# Patient Record
Sex: FEMALE | Employment: UNEMPLOYED | URBAN - METROPOLITAN AREA
[De-identification: names, ages, dates, MRNs, and addresses within clinical notes are randomized per-mention and may not be internally consistent; named-entity substitution may affect disease eponyms.]

---

## 2024-05-06 ENCOUNTER — HOSPITAL ENCOUNTER (EMERGENCY)
Age: 29
Discharge: HOME | End: 2024-05-06
Attending: EMERGENCY MEDICINE
Payer: MEDICAID

## 2024-05-06 VITALS
SYSTOLIC BLOOD PRESSURE: 111 MMHG | WEIGHT: 92 LBS | TEMPERATURE: 98.8 F | DIASTOLIC BLOOD PRESSURE: 82 MMHG | HEART RATE: 98 BPM | OXYGEN SATURATION: 100 % | RESPIRATION RATE: 16 BRPM

## 2024-05-06 DIAGNOSIS — R11.2 CANNABINOID HYPEREMESIS SYNDROME: ICD-10-CM

## 2024-05-06 DIAGNOSIS — R19.7 NAUSEA VOMITING AND DIARRHEA: ICD-10-CM

## 2024-05-06 DIAGNOSIS — F12.90 CANNABINOID HYPEREMESIS SYNDROME: ICD-10-CM

## 2024-05-06 DIAGNOSIS — Z32.02 URINE PREGNANCY TEST NEGATIVE: ICD-10-CM

## 2024-05-06 DIAGNOSIS — E11.65 HYPERGLYCEMIA DUE TO DIABETES MELLITUS (HCC): ICD-10-CM

## 2024-05-06 DIAGNOSIS — R11.2 NAUSEA VOMITING AND DIARRHEA: ICD-10-CM

## 2024-05-06 DIAGNOSIS — R82.71 ASB (ASYMPTOMATIC BACTERIURIA): ICD-10-CM

## 2024-05-06 DIAGNOSIS — E86.0 DEHYDRATION: Primary | ICD-10-CM

## 2024-05-06 LAB
ALBUMIN SERPL-MCNC: 4 G/DL (ref 3.4–5)
ALP SERPL-CCNC: 117 U/L (ref 40–129)
ALT SERPL-CCNC: 17 U/L (ref 10–40)
ANION GAP SERPL CALCULATED.3IONS-SCNC: 17 MMOL/L (ref 3–16)
AST SERPL-CCNC: 16 U/L (ref 15–37)
BACTERIA URNS QL MICRO: ABNORMAL /HPF
BASE EXCESS BLDV CALC-SCNC: -6.3 MMOL/L (ref -3–3)
BASOPHILS # BLD: 0 K/UL (ref 0–0.2)
BASOPHILS NFR BLD: 0.5 %
BETA-HYDROXYBUTYRATE: 0.18 MMOL/L (ref 0–0.27)
BILIRUB DIRECT SERPL-MCNC: <0.2 MG/DL (ref 0–0.3)
BILIRUB INDIRECT SERPL-MCNC: NORMAL MG/DL (ref 0–1)
BILIRUB SERPL-MCNC: 0.3 MG/DL (ref 0–1)
BILIRUB UR QL STRIP.AUTO: NEGATIVE
BUN SERPL-MCNC: 26 MG/DL (ref 7–20)
CALCIUM SERPL-MCNC: 9.5 MG/DL (ref 8.3–10.6)
CHLORIDE SERPL-SCNC: 105 MMOL/L (ref 99–110)
CLARITY UR: CLEAR
CO2 BLDV-SCNC: 19 MMOL/L
CO2 SERPL-SCNC: 16 MMOL/L (ref 21–32)
COLOR UR: YELLOW
CREAT SERPL-MCNC: 0.8 MG/DL (ref 0.6–1.1)
DEPRECATED RDW RBC AUTO: 13.7 % (ref 12.4–15.4)
EOSINOPHIL # BLD: 0.1 K/UL (ref 0–0.6)
EOSINOPHIL NFR BLD: 1.6 %
EPI CELLS #/AREA URNS HPF: ABNORMAL /HPF (ref 0–5)
GFR SERPLBLD CREATININE-BSD FMLA CKD-EPI: >90 ML/MIN/{1.73_M2}
GLUCOSE SERPL-MCNC: 199 MG/DL (ref 70–99)
GLUCOSE UR STRIP.AUTO-MCNC: 500 MG/DL
HCG UR QL: NEGATIVE
HCO3 BLDV-SCNC: 19.3 MMOL/L (ref 23–29)
HCT VFR BLD AUTO: 33.7 % (ref 36–48)
HGB BLD-MCNC: 11.4 G/DL (ref 12–16)
HGB UR QL STRIP.AUTO: ABNORMAL
HYALINE CASTS #/AREA URNS LPF: ABNORMAL /LPF (ref 0–2)
KETONES UR STRIP.AUTO-MCNC: ABNORMAL MG/DL
LEUKOCYTE ESTERASE UR QL STRIP.AUTO: NEGATIVE
LIPASE SERPL-CCNC: 28 U/L (ref 13–60)
LYMPHOCYTES # BLD: 2.9 K/UL (ref 1–5.1)
LYMPHOCYTES NFR BLD: 30.7 %
MCH RBC QN AUTO: 29.8 PG (ref 26–34)
MCHC RBC AUTO-ENTMCNC: 33.9 G/DL (ref 31–36)
MCV RBC AUTO: 87.8 FL (ref 80–100)
MONOCYTES # BLD: 0.6 K/UL (ref 0–1.3)
MONOCYTES NFR BLD: 6.4 %
NEUTROPHILS # BLD: 5.7 K/UL (ref 1.7–7.7)
NEUTROPHILS NFR BLD: 60.8 %
NITRITE UR QL STRIP.AUTO: NEGATIVE
O2 THERAPY: ABNORMAL
PCO2 BLDV: 35 MMHG (ref 40–50)
PH BLDV: 7.35 [PH] (ref 7.35–7.45)
PH UR STRIP.AUTO: 6 [PH] (ref 5–8)
PLATELET # BLD AUTO: 419 K/UL (ref 135–450)
PMV BLD AUTO: 8.7 FL (ref 5–10.5)
PO2 BLDV: 39.9 MMHG (ref 25–40)
POTASSIUM SERPL-SCNC: 4.3 MMOL/L (ref 3.5–5.1)
PROT SERPL-MCNC: 7.4 G/DL (ref 6.4–8.2)
PROT UR STRIP.AUTO-MCNC: 100 MG/DL
RBC # BLD AUTO: 3.84 M/UL (ref 4–5.2)
RBC #/AREA URNS HPF: ABNORMAL /HPF (ref 0–4)
SAO2 % BLDV: 73 %
SODIUM SERPL-SCNC: 138 MMOL/L (ref 136–145)
SP GR UR STRIP.AUTO: 1.02 (ref 1–1.03)
UA COMPLETE W REFLEX CULTURE PNL UR: ABNORMAL
UA DIPSTICK W REFLEX MICRO PNL UR: YES
URN SPEC COLLECT METH UR: ABNORMAL
UROBILINOGEN UR STRIP-ACNC: 0.2 E.U./DL
WBC # BLD AUTO: 9.3 K/UL (ref 4–11)
WBC #/AREA URNS HPF: ABNORMAL /HPF (ref 0–5)

## 2024-05-06 PROCEDURE — 80048 BASIC METABOLIC PNL TOTAL CA: CPT

## 2024-05-06 PROCEDURE — 82010 KETONE BODYS QUAN: CPT

## 2024-05-06 PROCEDURE — 96361 HYDRATE IV INFUSION ADD-ON: CPT

## 2024-05-06 PROCEDURE — 2580000003 HC RX 258: Performed by: NURSE PRACTITIONER

## 2024-05-06 PROCEDURE — 83690 ASSAY OF LIPASE: CPT

## 2024-05-06 PROCEDURE — 82803 BLOOD GASES ANY COMBINATION: CPT

## 2024-05-06 PROCEDURE — 6360000002 HC RX W HCPCS: Performed by: NURSE PRACTITIONER

## 2024-05-06 PROCEDURE — 36415 COLL VENOUS BLD VENIPUNCTURE: CPT

## 2024-05-06 PROCEDURE — 85025 COMPLETE CBC W/AUTO DIFF WBC: CPT

## 2024-05-06 PROCEDURE — 99284 EMERGENCY DEPT VISIT MOD MDM: CPT

## 2024-05-06 PROCEDURE — 84703 CHORIONIC GONADOTROPIN ASSAY: CPT

## 2024-05-06 PROCEDURE — 81001 URINALYSIS AUTO W/SCOPE: CPT

## 2024-05-06 PROCEDURE — 80076 HEPATIC FUNCTION PANEL: CPT

## 2024-05-06 PROCEDURE — 96374 THER/PROPH/DIAG INJ IV PUSH: CPT

## 2024-05-06 PROCEDURE — 96375 TX/PRO/DX INJ NEW DRUG ADDON: CPT

## 2024-05-06 RX ORDER — ACYCLOVIR 400 MG/1
TABLET ORAL
COMMUNITY
Start: 2024-04-06

## 2024-05-06 RX ORDER — 0.9 % SODIUM CHLORIDE 0.9 %
1000 INTRAVENOUS SOLUTION INTRAVENOUS ONCE
Status: COMPLETED | OUTPATIENT
Start: 2024-05-06 | End: 2024-05-06

## 2024-05-06 RX ORDER — ONDANSETRON 2 MG/ML
4 INJECTION INTRAMUSCULAR; INTRAVENOUS ONCE
Status: COMPLETED | OUTPATIENT
Start: 2024-05-06 | End: 2024-05-06

## 2024-05-06 RX ORDER — HALOPERIDOL 5 MG/ML
5 INJECTION INTRAMUSCULAR ONCE
Status: COMPLETED | OUTPATIENT
Start: 2024-05-06 | End: 2024-05-06

## 2024-05-06 RX ORDER — INSULIN GLARGINE 100 [IU]/ML
10 INJECTION, SOLUTION SUBCUTANEOUS 2 TIMES DAILY
COMMUNITY

## 2024-05-06 RX ADMIN — HALOPERIDOL LACTATE 5 MG: 5 INJECTION, SOLUTION INTRAMUSCULAR at 18:41

## 2024-05-06 RX ADMIN — ONDANSETRON 4 MG: 2 INJECTION INTRAMUSCULAR; INTRAVENOUS at 17:37

## 2024-05-06 RX ADMIN — SODIUM CHLORIDE 1000 ML: 9 INJECTION, SOLUTION INTRAVENOUS at 17:37

## 2024-05-06 ASSESSMENT — PAIN - FUNCTIONAL ASSESSMENT
PAIN_FUNCTIONAL_ASSESSMENT: NONE - DENIES PAIN
PAIN_FUNCTIONAL_ASSESSMENT: NONE - DENIES PAIN

## 2024-05-06 NOTE — DISCHARGE INSTRUCTIONS
Return to the emergency department for new or worsening symptoms including, limited to, developing fever, chills, sweats, inability to tolerate food or drink, worsening pain especially if it localizes to the right lower quadrant of your abdomen, or other symptoms/concerns.    Ensure that you are drinking plenty of fluids.    Continue to take your insulins as prescribed without missing doses.    Follow-up with your primary care provider or with the PRS for reevaluation next 1-2 days.    Likely etiology of your nausea and vomiting is cannabinol hyperemesis syndrome.  The only way to prevent the symptoms from returning is to discontinue the use of cannabinoids to be day smoked or consumed via edibles.

## 2024-05-06 NOTE — ED NOTES
Pt put light on c/o rt leg feeling tingling  had similar episode during the night lasted few minutes family had rubbed leg to improve pain

## 2024-05-07 ASSESSMENT — ENCOUNTER SYMPTOMS
EYE PAIN: 0
VOMITING: 1
SORE THROAT: 0
COUGH: 0
NAUSEA: 1
SHORTNESS OF BREATH: 0
BACK PAIN: 0
ANAL BLEEDING: 0
DIARRHEA: 1
ABDOMINAL PAIN: 0

## 2024-05-07 NOTE — ED PROVIDER NOTES
In addition to the advanced practice provider, I personally saw Eduardo Sierra and performed a substantive portion of the visit including all aspects of the medical decision making.    Medical Decision Making  Patient presented to the emergency department with nausea, vomiting, and diarrhea starting at 4 AM.  She has not had hematemesis or hematochezia.  She is diabetic.  She is experiencing some abdominal cramping, but is not having significant abdominal pain.    Patient's evaluation was significant for anion gap acidosis on her metabolic panel.  She also had an elevated BUN, with prerenal azotemia.  These findings are likely secondary to dehydration.  However, given the patient's diabetic status, it was important to rule out DKA.  The patient's glucose was only 199, which would make DKA much less likely.  Further, her beta hydroxybutyrate was normal at 0.18.  Given this, patient's findings are representative of dehydration and not DKA.    After receiving Haldol 5 mg IM, 1 L normal saline bolus of IV fluid resuscitation, and Zofran 4 mg IV, patient reported significant improvement of symptoms with near resolution.  At this time, given her otherwise unremarkable workup and normal vitals, patient is safe for discharge home.  Discussed the above plan and findings with the patient and her significant other at bedside.  They knowledge understanding and are in agreement    FINAL IMPRESSION  1. Nausea vomiting and diarrhea    2. Cannabinoid hyperemesis syndrome    3. Urine pregnancy test negative    4. ASB (asymptomatic bacteriuria)    5. Hyperglycemia due to diabetes mellitus (HCC)        Blood pressure 111/82, pulse 98, temperature 98.8 °F (37.1 °C), temperature source Oral, resp. rate 16, weight 41.7 kg (92 lb), last menstrual period 03/04/2024, SpO2 100 %.     For further details of Eduardo Sierra's emergency department encounter, please see documentation by advanced practice provider, Nika Evans CNP.

## 2024-05-07 NOTE — ED PROVIDER NOTES
[facility]  EMERGENCY DEPARTMENT ENCOUNTER        Pt Name: Eduardo Sierra  MRN: [id number]  Birthdate 1995  Date of evaluation: 5/6/2024  Provider: LYUDMILA Galvez CNP  PCP: No primary care provider on file.  Note Started: 9:30 PM EDT 5/6/24       I have seen and evaluated this patient with my supervising physician, Dr. Aldrich.      CHIEF COMPLAINT       Chief Complaint   Patient presents with    Emesis     Pt states awoke around 4am with vomiting and diarrhea several episodes.       HISTORY OF PRESENT ILLNESS: 1 or more Elements     History from : Patient    Limitations to history : None    Eduardo Sierra is a 29 y.o. nontoxic, well-appearing female who presents the emergency department with acute onset at 0400 hrs. with nausea, vomiting, and diarrhea.  Accompanying symptoms include decreased appetite.  She endorses vomiting x 2 and diarrhea x 5 today.  She is on no blood in her vomit or stool.  Currently symptoms include diffuse abdominal pain described as \"sore\" which she states may be mostly from vomiting and retching.  Denies chest pain, light-skinned is not recent, shortness of breath, fever, chills or sweats, cough, change in ability to smell/taste, headache, body aches, nasal chest congestion, urinary frequency, urgency, dysuria, retention, or other symptoms/concerns.  LMP 3/15/2024.  Patient endorses daily marijuana use.  She also endorses the urge to take warm showers and states that warm showers to help with her symptoms but the symptoms returned shortly after exiting the shower.    Nursing Notes were all reviewed and agreed with or any disagreements were addressed in the HPI.    REVIEW OF SYSTEMS :      Review of Systems   Constitutional:  Positive for appetite change. Negative for chills, diaphoresis, fatigue and fever.   HENT:  Negative for congestion and sore throat.    Eyes:  Negative for pain and visual disturbance.   Respiratory:  Negative for